# Patient Record
Sex: FEMALE | URBAN - METROPOLITAN AREA
[De-identification: names, ages, dates, MRNs, and addresses within clinical notes are randomized per-mention and may not be internally consistent; named-entity substitution may affect disease eponyms.]

---

## 2018-04-09 ENCOUNTER — APPOINTMENT (RX ONLY)
Dept: URBAN - METROPOLITAN AREA CLINIC 11 | Facility: CLINIC | Age: 53
Setting detail: DERMATOLOGY
End: 2018-04-09

## 2018-04-09 DIAGNOSIS — Z41.1 ENCOUNTER FOR COSMETIC SURGERY: ICD-10-CM

## 2018-04-09 PROCEDURE — ? OTHER

## 2018-04-09 PROCEDURE — ? CPT BILLER

## 2018-04-09 PROCEDURE — ? CONSULTATION - BREAST (COSMETIC)

## 2018-04-09 PROCEDURE — 99004: CPT

## 2018-04-09 ASSESSMENT — LOCATION DETAILED DESCRIPTION DERM
LOCATION DETAILED: LEFT MEDIAL BREAST 8-9:00 REGION
LOCATION DETAILED: RIGHT MEDIAL BREAST 4-5:00 REGION
LOCATION DETAILED: LEFT MEDIAL BREAST 9-10:00 REGION
LOCATION DETAILED: RIGHT MEDIAL BREAST 3-4:00 REGION

## 2018-04-09 ASSESSMENT — LOCATION SIMPLE DESCRIPTION DERM
LOCATION SIMPLE: LEFT BREAST
LOCATION SIMPLE: RIGHT BREAST

## 2018-04-09 ASSESSMENT — LOCATION ZONE DERM: LOCATION ZONE: TRUNK

## 2018-04-09 NOTE — PROCEDURE: OTHER
Other (Free Text): You will need to have a mammogram prior to cosmetic surgery. Please obtain a mammogram and have the report sent to our office.
Detail Level: Zone

## 2018-04-09 NOTE — HPI: BREAST (BREAST AUGMENTATION CONSULTATION)
Which Breast(S) Are You Concerned About?: bilateral breasts
Which Side(S)?: both breasts
Is This A New Presentation, Or A Follow-Up?: other
Additional History: Patient states she had saline implants (pt is unsure of size) placed in April 2004 by Dr. Ching. Pt states she feels that they have slowly decreased in size and have started sagging.

## 2018-04-09 NOTE — PROCEDURE: CPT BILLER
Cpt Code #1: 17334
Surgery Global Period: 0
Detail Level: Generalized
Procedure Summary (Optional): cosmetic consult \\ncharge 0.00